# Patient Record
Sex: FEMALE | Race: WHITE | URBAN - METROPOLITAN AREA
[De-identification: names, ages, dates, MRNs, and addresses within clinical notes are randomized per-mention and may not be internally consistent; named-entity substitution may affect disease eponyms.]

---

## 2016-07-15 LAB
ANTIBODY SCREEN, EXTERNAL: NEGATIVE
CHLAMYDIA, EXTERNAL: NEGATIVE
HBSAG, EXTERNAL: NEGATIVE
N. GONORRHEA, EXTERNAL: NEGATIVE
RUBELLA, EXTERNAL: NORMAL
T. PALLIDUM, EXTERNAL: NEGATIVE
TYPE, ABO & RH, EXTERNAL: NORMAL

## 2017-01-25 LAB — GRBS, EXTERNAL: NEGATIVE

## 2017-02-23 ENCOUNTER — ANESTHESIA EVENT (OUTPATIENT)
Dept: LABOR AND DELIVERY | Age: 32
End: 2017-02-23
Payer: COMMERCIAL

## 2017-02-23 ENCOUNTER — ANESTHESIA (OUTPATIENT)
Dept: LABOR AND DELIVERY | Age: 32
End: 2017-02-23
Payer: COMMERCIAL

## 2017-02-23 ENCOUNTER — HOSPITAL ENCOUNTER (INPATIENT)
Age: 32
LOS: 2 days | Discharge: HOME | End: 2017-02-25
Attending: OBSTETRICS & GYNECOLOGY | Admitting: OBSTETRICS & GYNECOLOGY
Payer: COMMERCIAL

## 2017-02-23 PROBLEM — Z34.90 PREGNANT: Status: ACTIVE | Noted: 2017-02-23

## 2017-02-23 LAB
ERYTHROCYTE [DISTWIDTH] IN BLOOD BY AUTOMATED COUNT: 15.1 % (ref 11.5–14.5)
HCT VFR BLD AUTO: 34.7 % (ref 35–47)
HGB BLD-MCNC: 10.9 G/DL (ref 11.5–16)
MCH RBC QN AUTO: 26.2 PG (ref 26–34)
MCHC RBC AUTO-ENTMCNC: 31.4 G/DL (ref 30–36.5)
MCV RBC AUTO: 83.4 FL (ref 80–99)
PLATELET # BLD AUTO: 248 K/UL (ref 150–400)
RBC # BLD AUTO: 4.16 M/UL (ref 3.8–5.2)
WBC # BLD AUTO: 12.6 K/UL (ref 3.6–11)

## 2017-02-23 PROCEDURE — 3E0R3CZ INTRODUCE REGIONAL ANESTH IN SPINAL CANAL, PERC: ICD-10-PCS | Performed by: OBSTETRICS & GYNECOLOGY

## 2017-02-23 PROCEDURE — 00HU33Z INSERTION OF INFUSION DEVICE INTO SPINAL CANAL, PERCUTANEOUS APPROACH: ICD-10-PCS | Performed by: OBSTETRICS & GYNECOLOGY

## 2017-02-23 PROCEDURE — 36415 COLL VENOUS BLD VENIPUNCTURE: CPT | Performed by: OBSTETRICS & GYNECOLOGY

## 2017-02-23 PROCEDURE — 85027 COMPLETE CBC AUTOMATED: CPT | Performed by: OBSTETRICS & GYNECOLOGY

## 2017-02-23 PROCEDURE — 77030034850

## 2017-02-23 PROCEDURE — 74011250636 HC RX REV CODE- 250/636: Performed by: OBSTETRICS & GYNECOLOGY

## 2017-02-23 PROCEDURE — 77030014125 HC TY EPDRL BBMI -B: Performed by: ANESTHESIOLOGY

## 2017-02-23 PROCEDURE — 10907ZC DRAINAGE OF AMNIOTIC FLUID, THERAPEUTIC FROM PRODUCTS OF CONCEPTION, VIA NATURAL OR ARTIFICIAL OPENING: ICD-10-PCS | Performed by: OBSTETRICS & GYNECOLOGY

## 2017-02-23 PROCEDURE — 65270000029 HC RM PRIVATE

## 2017-02-23 PROCEDURE — 74011250636 HC RX REV CODE- 250/636: Performed by: ANESTHESIOLOGY

## 2017-02-23 PROCEDURE — 0HQ9XZZ REPAIR PERINEUM SKIN, EXTERNAL APPROACH: ICD-10-PCS | Performed by: OBSTETRICS & GYNECOLOGY

## 2017-02-23 PROCEDURE — 74011000250 HC RX REV CODE- 250

## 2017-02-23 RX ORDER — SODIUM CHLORIDE, SODIUM LACTATE, POTASSIUM CHLORIDE, CALCIUM CHLORIDE 600; 310; 30; 20 MG/100ML; MG/100ML; MG/100ML; MG/100ML
125 INJECTION, SOLUTION INTRAVENOUS CONTINUOUS
Status: DISCONTINUED | OUTPATIENT
Start: 2017-02-23 | End: 2017-02-23

## 2017-02-23 RX ORDER — SODIUM CHLORIDE 0.9 % (FLUSH) 0.9 %
5-10 SYRINGE (ML) INJECTION EVERY 8 HOURS
Status: DISCONTINUED | OUTPATIENT
Start: 2017-02-23 | End: 2017-02-23

## 2017-02-23 RX ORDER — SODIUM CHLORIDE 0.9 % (FLUSH) 0.9 %
5-10 SYRINGE (ML) INJECTION AS NEEDED
Status: DISCONTINUED | OUTPATIENT
Start: 2017-02-23 | End: 2017-02-23

## 2017-02-23 RX ORDER — NALOXONE HYDROCHLORIDE 0.4 MG/ML
0.4 INJECTION, SOLUTION INTRAMUSCULAR; INTRAVENOUS; SUBCUTANEOUS AS NEEDED
Status: DISCONTINUED | OUTPATIENT
Start: 2017-02-23 | End: 2017-02-23

## 2017-02-23 RX ORDER — DOCUSATE SODIUM 100 MG/1
100 CAPSULE, LIQUID FILLED ORAL
Status: DISCONTINUED | OUTPATIENT
Start: 2017-02-23 | End: 2017-02-25 | Stop reason: HOSPADM

## 2017-02-23 RX ORDER — IBUPROFEN 800 MG/1
800 TABLET ORAL EVERY 8 HOURS
Status: DISCONTINUED | OUTPATIENT
Start: 2017-02-24 | End: 2017-02-25 | Stop reason: HOSPADM

## 2017-02-23 RX ORDER — TERBUTALINE SULFATE 1 MG/ML
INJECTION SUBCUTANEOUS
Status: DISCONTINUED
Start: 2017-02-23 | End: 2017-02-23

## 2017-02-23 RX ORDER — ZOLPIDEM TARTRATE 5 MG/1
5 TABLET ORAL
Status: DISCONTINUED | OUTPATIENT
Start: 2017-02-23 | End: 2017-02-25 | Stop reason: HOSPADM

## 2017-02-23 RX ORDER — HYDROCORTISONE ACETATE PRAMOXINE HCL 2.5; 1 G/100G; G/100G
CREAM TOPICAL AS NEEDED
Status: DISCONTINUED | OUTPATIENT
Start: 2017-02-23 | End: 2017-02-25 | Stop reason: HOSPADM

## 2017-02-23 RX ORDER — NALOXONE HYDROCHLORIDE 0.4 MG/ML
0.4 INJECTION, SOLUTION INTRAMUSCULAR; INTRAVENOUS; SUBCUTANEOUS AS NEEDED
Status: DISCONTINUED | OUTPATIENT
Start: 2017-02-23 | End: 2017-02-25 | Stop reason: HOSPADM

## 2017-02-23 RX ORDER — SIMETHICONE 80 MG
80 TABLET,CHEWABLE ORAL
Status: DISCONTINUED | OUTPATIENT
Start: 2017-02-23 | End: 2017-02-25 | Stop reason: HOSPADM

## 2017-02-23 RX ORDER — BUTORPHANOL TARTRATE 1 MG/ML
1 INJECTION INTRAMUSCULAR; INTRAVENOUS
Status: DISCONTINUED | OUTPATIENT
Start: 2017-02-23 | End: 2017-02-23

## 2017-02-23 RX ORDER — OXYTOCIN IN 5 % DEXTROSE 30/500 ML
1-25 PLASTIC BAG, INJECTION (ML) INTRAVENOUS
Status: DISCONTINUED | OUTPATIENT
Start: 2017-02-23 | End: 2017-02-23

## 2017-02-23 RX ORDER — ACETAMINOPHEN 325 MG/1
650 TABLET ORAL
Status: DISCONTINUED | OUTPATIENT
Start: 2017-02-23 | End: 2017-02-25 | Stop reason: HOSPADM

## 2017-02-23 RX ORDER — ONDANSETRON 4 MG/1
4 TABLET, ORALLY DISINTEGRATING ORAL
Status: DISCONTINUED | OUTPATIENT
Start: 2017-02-23 | End: 2017-02-23

## 2017-02-23 RX ORDER — FENTANYL/BUPIVACAINE/NS/PF 2-1250MCG
1-16 PREFILLED PUMP RESERVOIR EPIDURAL CONTINUOUS
Status: DISCONTINUED | OUTPATIENT
Start: 2017-02-23 | End: 2017-02-23

## 2017-02-23 RX ORDER — MAG HYDROX/ALUMINUM HYD/SIMETH 200-200-20
30 SUSPENSION, ORAL (FINAL DOSE FORM) ORAL
Status: DISCONTINUED | OUTPATIENT
Start: 2017-02-23 | End: 2017-02-23

## 2017-02-23 RX ORDER — LIDOCAINE HYDROCHLORIDE AND EPINEPHRINE 20; 5 MG/ML; UG/ML
INJECTION, SOLUTION EPIDURAL; INFILTRATION; INTRACAUDAL; PERINEURAL AS NEEDED
Status: DISCONTINUED | OUTPATIENT
Start: 2017-02-23 | End: 2017-02-23 | Stop reason: HOSPADM

## 2017-02-23 RX ORDER — ONDANSETRON 2 MG/ML
4 INJECTION INTRAMUSCULAR; INTRAVENOUS
Status: DISCONTINUED | OUTPATIENT
Start: 2017-02-23 | End: 2017-02-25 | Stop reason: HOSPADM

## 2017-02-23 RX ORDER — ADHESIVE BANDAGE
30 BANDAGE TOPICAL DAILY PRN
Status: DISCONTINUED | OUTPATIENT
Start: 2017-02-23 | End: 2017-02-23

## 2017-02-23 RX ORDER — HYDROCODONE BITARTRATE AND ACETAMINOPHEN 5; 325 MG/1; MG/1
1 TABLET ORAL
Status: DISCONTINUED | OUTPATIENT
Start: 2017-02-23 | End: 2017-02-25 | Stop reason: HOSPADM

## 2017-02-23 RX ORDER — ACETAMINOPHEN 325 MG/1
650 TABLET ORAL
Status: DISCONTINUED | OUTPATIENT
Start: 2017-02-23 | End: 2017-02-23

## 2017-02-23 RX ORDER — OXYTOCIN/RINGER'S LACTATE 20/1000 ML
125-500 PLASTIC BAG, INJECTION (ML) INTRAVENOUS ONCE
Status: ACTIVE | OUTPATIENT
Start: 2017-02-24 | End: 2017-02-24

## 2017-02-23 RX ORDER — DIPHENHYDRAMINE HCL 25 MG
25 CAPSULE ORAL
Status: DISCONTINUED | OUTPATIENT
Start: 2017-02-23 | End: 2017-02-25 | Stop reason: HOSPADM

## 2017-02-23 RX ORDER — SODIUM CHLORIDE, SODIUM LACTATE, POTASSIUM CHLORIDE, CALCIUM CHLORIDE 600; 310; 30; 20 MG/100ML; MG/100ML; MG/100ML; MG/100ML
999 INJECTION, SOLUTION INTRAVENOUS ONCE
Status: DISCONTINUED | OUTPATIENT
Start: 2017-02-23 | End: 2017-02-23

## 2017-02-23 RX ADMIN — LIDOCAINE HYDROCHLORIDE AND EPINEPHRINE 5 ML: 20; 5 INJECTION, SOLUTION EPIDURAL; INFILTRATION; INTRACAUDAL; PERINEURAL at 13:42

## 2017-02-23 RX ADMIN — LIDOCAINE HYDROCHLORIDE AND EPINEPHRINE 5 ML: 20; 5 INJECTION, SOLUTION EPIDURAL; INFILTRATION; INTRACAUDAL; PERINEURAL at 13:46

## 2017-02-23 RX ADMIN — SODIUM CHLORIDE, SODIUM LACTATE, POTASSIUM CHLORIDE, AND CALCIUM CHLORIDE 125 ML/HR: 600; 310; 30; 20 INJECTION, SOLUTION INTRAVENOUS at 13:20

## 2017-02-23 RX ADMIN — FENTANYL 0.2 MG/100ML-BUPIV 0.125%-NACL 0.9% EPIDURAL INJ 12 ML/HR: 2/0.125 SOLUTION at 13:54

## 2017-02-23 RX ADMIN — FENTANYL 0.2 MG/100ML-BUPIV 0.125%-NACL 0.9% EPIDURAL INJ 12 ML/HR: 2/0.125 SOLUTION at 19:11

## 2017-02-23 RX ADMIN — SODIUM CHLORIDE, SODIUM LACTATE, POTASSIUM CHLORIDE, AND CALCIUM CHLORIDE 125 ML/HR: 600; 310; 30; 20 INJECTION, SOLUTION INTRAVENOUS at 10:55

## 2017-02-23 NOTE — PROGRESS NOTES
Labor Progress Note  Patient seen, fetal heart rate and contraction pattern evaluated at 110pm. Patient is starting to feel more pain with her contractions. Physical Exam:  Vitals:   Vitals:    02/23/17 1042 02/23/17 1101 02/23/17 1102   BP: 120/76  120/76   Pulse: 93  93   Resp:   18   Temp:   97.9 °F (36.6 °C)   Weight:  88 kg (194 lb)    Height:  5' 6\" (1.676 m)          Cervical Exam was examined   4 cm dilated    100% effaced    -1 station    Presenting Part: cephalic  Cervical Position: mid position  Consistency: Soft  AROM, clear fluid    Uterine Activity[de-identified] Frequency: Every 3-5 minutes  Fetal Heart Rate: Reactive  Pitocin: 0    Assessment/Plan:  Ms. Zoë Barth is admitted with pregnancy at 39w6d with labor. RFS. AROM done, Epidural PRN.      1.  Continue expectant management    Romaine Bower MD  2/23/2017  1:12 PM

## 2017-02-23 NOTE — PROGRESS NOTES
Labor Progress Note  Patient seen, fetal heart rate and contraction pattern evaluated at 415pm. Called to see patient due to prolonged deceleration for 7 min. Nursing noted patient had a tetonic contraction during this time period. Physical Exam:  Vitals:   Vitals:    02/23/17 1410 02/23/17 1512 02/23/17 1558 02/23/17 1559   BP:    (!) 87/53   Pulse:  91  79   Resp: 18 18     Temp: 98.2 °F (36.8 °C) 97.9 °F (36.6 °C) 98.3 °F (36.8 °C)    Weight:       Height:             Cervical Exam was examined   8 cm dilated    100% effaced    0 station    Presenting Part: cephalic  Cervical Position: anterior  Consistency: Soft  IUPC/FSE    Uterine Activity[de-identified] Frequency: Every 2 minutes  Fetal Heart Rate: Baseline: 120 per minute  Variability: moderate  Accelerations: yes  Decelerations: prolonged variable  Pitocin: 0    Assessment/Plan:  Ms. Karyle Cristal is admitted with pregnancy at 39w6d with labor. Patient is making consistent cervical change. Suspect deceleration was related to her 7 min contraction that resolved spontaneously. Internal monitors placed. Will monitor closely. Anticipate vaginal delivery.      1.  Continue expectant management    Bhupendra Temple MD  2/23/2017  4:16 PM

## 2017-02-23 NOTE — PROGRESS NOTES
arrived ambulatory for labor. Placed on EFMx2. Consents explained, pt verbalized understanding, then signed. 11:34 AM  Dr. Virginie Merchant at bedside. 1:33 PM  KATIE Scruggs at bedside for epidural placement. 1:37 PM  Timeout. 1:43 PM test dose. 2:32 PM  INDY Garcia assuming care.

## 2017-02-23 NOTE — IP AVS SNAPSHOT
[address] 
[phone] Patient: Suzie Simon MRN: [id number] :1985 You are allergic to the following No active allergies Recent Documentation Height  
  
  
  
  
  
 1.676 m Unresulted Labs Order Current Status SAMPLE TO BLOOD BANK In process Emergency Contacts Name Discharge Info Relation Home Work Mobile [place] DISCHARGE CAREGIVER [3] Spouse [3]   [phone] About your hospitalization You were admitted on:  2017 You last received care in the:  [facility] 2 LABOR & DELIVERY You were discharged on:  2017 Unit phone number:  [phone] Why you were hospitalized Your primary diagnosis was:  Not on File Your diagnoses also included:  Pregnant Providers Seen During Your Hospitalizations Provider Role Specialty Primary office phone Winston Baumann MD Attending Provider Obstetrics & Gynecology [phone] Your Primary Care Physician (PCP) Primary Care Physician Office Phone Office Fax Tiffanie De Los Santos [phone] Follow-up Information Follow up With Details Comments Contact Info Winston Baumann MD   [address] [facility] [address] [place] [address] 
[phone] Current Discharge Medication List  
  
ASK your doctor about these medications Dose & Instructions Dispensing Information Comments Morning Noon Evening Bedtime  
 prenatal multivit-ca-min-fe-fa Tab Your next dose is: Today, Tomorrow Other:  _________ Dose:  1 Cap Take 1 Cap by mouth daily. Indications: Pregnancy Refills:  0 Discharge Instructions Discharge Instructions for Vaginal Delivery Patient ID: Suzie Simon [id number] 
28 y.o. 
1985 Take Home Medications Continue taking your prenatal vitamins if you are breastfeeding. Follow-up care is a key part of your treatment and safety. Please schedule and keep appointments. Follow-up with your primary OB in 6 weeks. Activity Avoid anything in your vagina for 6 weeks (no intercourse, tampons, or douching). You may drive unless you are taking prescription pain medications. Climbing stairs and light lifting are okay. Please avoid excessive exercise, though walking is okay- you'll be tired! Diet Regular diet as tolerated. Be sure to drink plenty of fluids if you are breastfeeding. Wound care If you have stitches, continue to rinse with a squirt bottle of warm water each time you void for about 7-10 days. .  Your stitches will gradually dissolve over four to eight weeks. Sitz baths are also helpful to keep the wound clean, encourage healing, and to help with pain associated with the stitches or hemorrhoids. You can use either a sitz bath basin or a bathtub filled with 2-3\" inches of plain warm water. Soak for 10 minutes 3 times a day as tolerated. Pain Management 1. Over the counter medications such as Tylenol and ibuprofen (Motrin or Advil) are ideal.  These may be taken together, alternating doses. You may  take the maximum dose:  Motrin or Advil (generic ibuprofen), either 3 tablets every 6 hours or 4 tablets every 8 hours or Tylenol (acetominophen) 1000mg every 6 hours (equivalent to 2 extra strength Tylenol). 2. You may also have a precrescription for stronger pain medication. Take only as needed and transition to over the counter medication in the next few days. Minimize amounts of the prescription medication, as it can be habit-forming and will worsen or cause constipation. Most patients will find that within a couple of days, their pain is adequately controlled using only over-the-counter medications.  
3. The prescription pain medication is mixed with Tylenol, therefore, you should not take any extra Tylenol or acetaminophen until you have reduced your prescription pain medication. 4. Add heating pad or sitz baths as needed. Add hemorrhoid wipes or ointments if needed Constipation 1. Constipation is normal after pregnancy and delivery, especially while taking prescription narcotic pain medication. 2. Over the counter remedies including ducosate (Colace), take 1-2 capsules 1-2 times daily for soft stool as needed. You may also add/ try milk of magnesia or rectal remedies such as Dulcolax or Fleets enema. Recovery: What to Expect at [facility] 1. Fatigue is expected. Try to rest when you can and don't worry about doing housework or other tasks which can wait. 2. The soreness along your bottom will improve significantly over the first 2 weeks, but it may take 6 weeks before you are completely recovered. 3. Back pain or general body aches or muscle soreness are expected and should improve with acetominophen or ibuprofen. 4. Leg swelling due to pregnancy and/or IV fluids given in the hospital will take about two weeks to resolve. 5. Most women experience some form of the \"Baby Blues\" after having a baby. Feeling emotional, tearful, frustrated, anxious, sad, and irritable some of the time is normal and go away after about 2 weeks. Adequate rest and help from your family will help. Take breaks from caring for the baby. Call your doctor if your symptoms seem severe, last more than 2 weeks, or seem to be getting worse instead of better. Get help immediately if you have thoughts of wanting to hurt yourself or others! Call your doctor or seek immediate medical care if you have: 
Heavy vaginal bleeding, soaking through one or more pads an hour for several hours. Foul-smelling discharge from your vagina or incision. Consistent nausea and vomiting and cannot keep fluids down. Consistent pain that does not get better after you take pain medicine.  
Sudden chest pain and shortness of breath Signs of a blood clot: pain/ swelling/ increasing redness in your lower extremeties Signs of infection: increased pain in your abdomen or vaginal area; red streaks, warmth, or tenderness of your breasts; fever of 100.5 F or greater Discharge Orders None [email or web address] Announcement We are excited to announce that we are making your provider's discharge notes available to you in [employer]. You will see these notes when they are completed and signed by the physician that discharged you from your recent hospital stay. If you have any questions or concerns about any information you see in [employer], please call the Health Information Department where you were seen or reach out to your Primary Care Provider for more information about your plan of care. Introducing [facility] & [facility] SERVICES! Marquise Quach introduces [employer] patient portal. Now you can access parts of your medical record, email your doctor's office, and request medication refills online. 1. In your internet browser, go to [email or web address] 2. Click on the First Time User? Click Here link in the Sign In box. You will see the New Member Sign Up page. 3. Enter your [employer] Access Code exactly as it appears below. You will not need to use this code after youve completed the sign-up process. If you do not sign up before the expiration date, you must request a new code. · [employer] Access Code: [email or web address] Expires: 4/9/2017  2:54 PM 
 
4. Enter the last four digits of your Social Security Number (xxxx) and Date of Birth (mm/dd/yyyy) as indicated and click Submit. You will be taken to the next sign-up page. 5. Create a [employer] ID. This will be your [employer] login ID and cannot be changed, so think of one that is secure and easy to remember. 6. Create a [employer] password. You can change your password at any time. 7. Enter your Password Reset Question and Answer.  This can be used at a later time if you forget your password. 8. Enter your e-mail address. You will receive e-mail notification when new information is available in [address] 9. Click Sign Up. You can now view and download portions of your medical record. 10. Click the Download Summary menu link to download a portable copy of your medical information. If you have questions, please visit the Frequently Asked Questions section of the [employer] Every[employer] website. Remember, [employer] is NOT to be used for urgent needs. For medical emergencies, dial 911. Now available from your iPhone and Android! General Information Please provide this summary of care documentation to your next provider. Patient Signature:  ____________________________________________________________ Date:  ____________________________________________________________  
  
Andree Guevara Provider Signature:  ____________________________________________________________ Date:  ____________________________________________________________
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
## 2017-02-23 NOTE — ANESTHESIA PREPROCEDURE EVALUATION
Anesthetic History   No history of anesthetic complications            Review of Systems / Medical History  Patient summary reviewed, nursing notes reviewed and pertinent labs reviewed    Pulmonary  Within defined limits                 Neuro/Psych   Within defined limits           Cardiovascular  Within defined limits                     GI/Hepatic/Renal  Within defined limits              Endo/Other  Within defined limits           Other Findings              Physical Exam    Airway  Mallampati: II  TM Distance: 4 - 6 cm  Neck ROM: normal range of motion   Mouth opening: Normal     Cardiovascular  Regular rate and rhythm,  S1 and S2 normal,  no murmur, click, rub, or gallop             Dental  No notable dental hx       Pulmonary  Breath sounds clear to auscultation               Abdominal  Abdominal exam normal       Other Findings            Anesthetic Plan    ASA: 2  Anesthesia type: epidural            Anesthetic plan and risks discussed with: Patient

## 2017-02-23 NOTE — PROGRESS NOTES
Prolong decel noted to 90s. Pt placed to left lateral position,  IV fluid bolus started and O2 via tight face mask in place. 4:09 PM  Dr. Britton Meza at bedside. Fetal tracing reviewed. Informed MD of prolonged decels, SVE 5-6cm and no terb given at this time. 4:15 PM  Dr. Noman William at bedside IUPC and FSE placed by MD.  6:57 PM  TRANSFER - OUT REPORT:    Verbal report given to TRACEY Salazar RN on [employer]  for routine progression of care       Report consisted of patients Situation, Background, Assessment and   Recommendations(SBAR). Information from the following report(s) SBAR and MAR was reviewed with the receiving nurse. Lines:   Peripheral IV 02/23/17 Right Hand (Active)   Site Assessment Clean, dry, & intact 2/23/2017 10:00 AM   Phlebitis Assessment 0 2/23/2017 10:00 AM   Infiltration Assessment 0 2/23/2017 10:00 AM   Dressing Status Clean, dry, & intact 2/23/2017 10:00 AM   Dressing Type Tape 2/23/2017 10:00 AM   Hub Color/Line Status Pink 2/23/2017 10:00 AM        Opportunity for questions and clarification was provided.

## 2017-02-23 NOTE — IP AVS SNAPSHOT
Current Discharge Medication List  
  
ASK your doctor about these medications Dose & Instructions Dispensing Information Comments Morning Noon Evening Bedtime  
 prenatal multivit-ca-min-fe-fa Tab Your next dose is: Today, Tomorrow Other:  ____________ Dose:  1 Cap Take 1 Cap by mouth daily. Indications: Pregnancy Refills:  0

## 2017-02-23 NOTE — ANESTHESIA PROCEDURE NOTES
Epidural Block    Start time: 2/23/2017 1:35 PM  End time: 2/23/2017 1:50 PM  Performed by: Maggie Stevens by: Maricarmen Shelby     Pre-Procedure  Indication: labor epidural    Preanesthetic Checklist: patient identified, risks and benefits discussed, anesthesia consent, timeout performed and anesthesia consent    Timeout Time: 13:37        Epidural:   Patient position:  Seated  Prep region:  Lumbar  Prep: Betadine    Location:  L3-4    Needle and Epidural Catheter:   Needle Type:  Tuohy  Needle Gauge:  17 G  Injection Technique:  Loss of resistance using air  Attempts:  1  Catheter Size:  18 G  Catheter at Skin Depth (cm):  11  Depth in Epidural Space (cm):  8  Events: no paresthesia and negative aspiration test    Test Dose:  Lidocaine 1.5% w/ epi and negative    Assessment:   Catheter Secured:  Tegaderm and tape  Insertion:  Uncomplicated  Patient tolerance:  Patient tolerated the procedure well with no immediate complications

## 2017-02-23 NOTE — PROGRESS NOTES
[address] [facility] Dr Ten Caro paged    2214 Received return call from Dr Deepak Lerma. Informed and ask Dr Ten Caro to review EFM strip. decels noted on strip with mod variability. Per Dr Deepak Lerma , he reviewed efm strip and he noted a few late decels and early. To place pt in trendelenburg and give 1 liter bolus.  Carla Gee RN resume care at 1500 and made aware of Dr Gerry Carrero orders

## 2017-02-23 NOTE — PROGRESS NOTES
Labor Progress Note  Patient seen, fetal heart rate and contraction pattern evaluated at 5pm. Patient is starting to feel rectal pressure. Physical Exam:  Vitals:   Vitals:    02/23/17 1617 02/23/17 1627 02/23/17 1628 02/23/17 1632   BP:   116/72    Pulse:   80    Resp:       Temp:       SpO2: 99% 99%  99%   Weight:       Height:             Cervical Exam was examined   9 cm dilated    100% effaced    0 station      Uterine Activity[de-identified] Frequency: Every 1-4 minutes  Fetal Heart Rate: Reactive  Pitocin: 0    Assessment/Plan:  Ms. Darryl Opitz is admitted with pregnancy at 40w5d in labor. Patient continues to make cervical change. Recheck PRN. Anticipate vaginal delivery.          Cesia Kwan MD  2/23/2017  5:06 PM

## 2017-02-24 PROCEDURE — 75410000003 HC RECOV DEL/VAG/CSECN EA 0.5 HR: Performed by: OBSTETRICS & GYNECOLOGY

## 2017-02-24 PROCEDURE — 75410000002 HC LABOR FEE PER 1 HR: Performed by: OBSTETRICS & GYNECOLOGY

## 2017-02-24 PROCEDURE — 77030018749 HC HK AMNIO DISP DERY -A

## 2017-02-24 PROCEDURE — 77030010848 HC CATH INTUTR PRSS KOLB -B

## 2017-02-24 PROCEDURE — 65270000029 HC RM PRIVATE

## 2017-02-24 PROCEDURE — 77030034850

## 2017-02-24 PROCEDURE — 75410000000 HC DELIVERY VAGINAL/SINGLE: Performed by: OBSTETRICS & GYNECOLOGY

## 2017-02-24 PROCEDURE — 76060000078 HC EPIDURAL ANESTHESIA: Performed by: NURSE ANESTHETIST, CERTIFIED REGISTERED

## 2017-02-24 PROCEDURE — 77010026064 HC OXYGEN INFANT MED AIR MIN: Performed by: OBSTETRICS & GYNECOLOGY

## 2017-02-24 NOTE — DISCHARGE INSTRUCTIONS
Discharge Instructions for Vaginal Delivery    Patient ID:  Keo Davenport  [id number]  28 y.o.  1985    Take Home Medications       Continue taking your prenatal vitamins if you are breastfeeding. Follow-up care is a key part of your treatment and safety. Please schedule and keep appointments. Follow-up with your primary OB in 6 weeks. Activity  Avoid anything in your vagina for 6 weeks (no intercourse, tampons, or douching). You may drive unless you are taking prescription pain medications. Climbing stairs and light lifting are okay. Please avoid excessive exercise, though walking is okay- you'll be tired! Diet  Regular diet as tolerated. Be sure to drink plenty of fluids if you are breastfeeding. Wound care  If you have stitches, continue to rinse with a squirt bottle of warm water each time you void for about 7-10 days. .  Your stitches will gradually dissolve over four to eight weeks. Sitz baths are also helpful to keep the wound clean, encourage healing, and to help with pain associated with the stitches or hemorrhoids. You can use either a sitz bath basin or a bathtub filled with 2-3\" inches of plain warm water. Soak for 10 minutes 3 times a day as tolerated. Pain Management  1. Over the counter medications such as Tylenol and ibuprofen (Motrin or Advil) are ideal.  These may be taken together, alternating doses. You may  take the maximum dose:  Motrin or Advil (generic ibuprofen), either 3 tablets every 6 hours or 4 tablets every 8 hours or Tylenol (acetominophen) 1000mg every 6 hours (equivalent to 2 extra strength Tylenol). 2. You may also have a precrescription for stronger pain medication. Take only as needed and transition to over the counter medication in the next few days. Minimize amounts of the prescription medication, as it can be habit-forming and will worsen or cause constipation.  Most patients will find that within a couple of days, their pain is adequately controlled using only over-the-counter medications. 3. The prescription pain medication is mixed with Tylenol, therefore, you should not take any extra Tylenol or acetaminophen until you have reduced your prescription pain medication. 4. Add heating pad or sitz baths as needed. Add hemorrhoid wipes or ointments if needed    Constipation  1. Constipation is normal after pregnancy and delivery, especially while taking prescription narcotic pain medication. 2. Over the counter remedies including ducosate (Colace), take 1-2 capsules 1-2 times daily for soft stool as needed. You may also add/ try milk of magnesia or rectal remedies such as Dulcolax or Fleets enema. Recovery: What to Expect at Home  1. Fatigue is expected. Try to rest when you can and don't worry about doing housework or other tasks which can wait. 2. The soreness along your bottom will improve significantly over the first 2 weeks, but it may take 6 weeks before you are completely recovered. 3. Back pain or general body aches or muscle soreness are expected and should improve with acetominophen or ibuprofen. 4. Leg swelling due to pregnancy and/or IV fluids given in the hospital will take about two weeks to resolve. 5. Most women experience some form of the \"Baby Blues\" after having a baby. Feeling emotional, tearful, frustrated, anxious, sad, and irritable some of the time is normal and go away after about 2 weeks. Adequate rest and help from your family will help. Take breaks from caring for the baby. Call your doctor if your symptoms seem severe, last more than 2 weeks, or seem to be getting worse instead of better. Get help immediately if you have thoughts of wanting to hurt yourself or others! Call your doctor or seek immediate medical care if you have:  Heavy vaginal bleeding, soaking through one or more pads an hour for several hours. Foul-smelling discharge from your vagina or incision.   Consistent nausea and vomiting and cannot keep fluids down. Consistent pain that does not get better after you take pain medicine.   Sudden chest pain and shortness of breath  Signs of a blood clot: pain/ swelling/ increasing redness in your lower extremeties  Signs of infection: increased pain in your abdomen or vaginal area; red streaks, warmth, or tenderness of your breasts; fever of 100.5 F or greater

## 2017-02-24 NOTE — L&D DELIVERY NOTE
Delivery Summary    Patient: Jax Guerra MRN: [id number]  SSN: [id number]    YOB: 1985  Age: 28 y.o. Sex: female        Labor Events:    Labor: No    Rupture Date: 2017    Rupture Time: 1:12 PM    Rupture Type AROM    Amniotic Fluid Volume: Large     Amniotic Fluid Description: Clear       Induction: None         Augmentation: None    Labor Complications: None     Additional Complications:        Cervical Ripening:       None      Delivery Events:  Episiotomy: None    Laceration(s): Left labial;First degree perineal       Repaired: Yes     Number of Repair Packets: 2    Suture Type and Size: Chromic 4-0        Estimated Blood Loss (ml): 300        Information for the patient's newbornTerrishania Rosenthal, Female [[id number]]     Delivery Summary - Baby    Delivery Date: 2017   Delivery Time: 10:40 PM   Delivery Type: Vaginal, Spontaneous Delivery  Sex:  female  Gestational Age: 39w6d  Delivery Clinician:  Armida Carranza  Living?: Yes   Delivery Location: L&D 207           APGARS  One minute Five minutes Ten minutes   Skin Color: 0    1       Heart Rate: 2   2         Reflex Irritability: 1   2         Muscle Tone: 0   2       Respiration: 0   2         Total: 3   9           Presentation: Compound  Position: Right Occiput Anterior  Resuscitation Method:  Tactile Stimulation;Suctioning-bulb;PPV;Suctioning-deep     Meconium Stained: None    Cord Information: 3 Vessels   Complications: None  Cord Blood Sent?:  No    Blood Gases Sent?:  No    Placenta:  Date/Time:  10:42 PM  Removal: Spontaneous      Appearance: Normal     [place] Measurements:  Birth Weight: 3.175 kg    Birth Length:     Head Circumference:       Chest Circumference:      Abdominal Girth:       Other Providers:   DEANNA SILVERIO;THAIS HARRISON;AMALIA TABARES;BOO MCGOVERN Obstetrician;Primary Nurse;Primary  Nurse;Staff Nurse;Crna;Nurse Practitioner;Midwife;Nursery Nurse           Cord Blood Results:  Information for the patient's :  Aaron Caldera, Female [[id number]]   No results found for: Jens Skains, PCTDIG, BILI, ABORHEXT, ABORH    Information for the patient's :  Aaron Caldera, Female [[id number]]   No results found for: APH, APCO2, APO2, AHCO3, ABEC, ABDC, O2ST, [place], [place], PHI, [place], PO2I, HCO3I, SO2I, IBD     Information for the patient's newbornRuben Jalloh, Female [[id number]]   No results found for: EPHV, PCO2V, PO2V, HCO3V, O2STV, EBDV

## 2017-02-24 NOTE — DISCHARGE SUMMARY
Patient ID:  Frankie Cornea  [id number]  16 y.o.  1985    Admit Date: 2017    Discharge Date:     Admitting Physician: Dorothy Duggan MD    Attending Physician: Dorothy Duggan MD    Admission Diagnoses: LABOR  Pregnant    Procedures for this admission:  epidural    Discharge Diagnoses: Same as above with  producing a viable infant. Information for the patient's :  London Orellana, Female [[id number]]   One Minute Apgar: 3 (Filed from Delivery Summary)  Five  Minute Apgar: 9 (Filed from Delivery Summary)   7lb 0oz  Left labial and vaginal 1st degree     Discharge Disposition:  good    Discharge Condition:  good    Additional Diagnoses: IUP 40wks 5 days labor. Maternal Labs:   Lab Results   Component Value Date/Time    HBsAg, External negative 07/15/2016    Rubella, External 1.53-immune 07/15/2016    GrBStrep, External negative 2017       Cord Blood Results:   Information for the patient's :  London Orellana, Female [[id number]]   No results found for: PCTABR, PCTDIG, BILI, ABORH          History of Present Illness:   OB History      Para Term  AB TAB SAB Ectopic Multiple Living    1                 Admitted for active labor. Hospital Course:   Patient was admitted as above and delivered via  . Please the chart for details. The postpartum course was unremarkable. She was deemed stable for discharge home on day 2.     Follow-up Care: 6wks        Signed:  Frieda Nguyen MD  2017  10:58 PM

## 2017-02-24 NOTE — PROGRESS NOTES
02/24/17 10:58 AM  CM met with patient to complete initial assessment and to begin discharge planning. Patient demographics confirmed. Patient works as a  in [place] and will return to work in September. Patient's /FOB Ramon Schmitt [phone]) works at [employer] and will remain out of work for 2-3 weeks. Patient is breastfeeding and has a pump to use. [facility] will provide medical follow up for the baby. Patient has car seat, crib, clothing, and other necessary supplies. Patient ineligible for [facility] and Medicaid services. Patient and  have a support system of local friends; their families live in [place], [place], and will visit the family and baby in the coming weeks. Care Management Interventions  PCP Verified by CM:  Yes  Transition of Care Consult (CM Consult): Discharge Planning  Current Support Network: Lives with Spouse  Confirm Follow Up Transport: Family  Plan discussed with Pt/Family/Caregiver: Yes  Discharge Location  Discharge Placement: [address] [place] [address]

## 2017-02-24 NOTE — PROGRESS NOTES
Labor Progress Note late entry from approx 2130  Patient seen, fetal heart rate and contraction pattern evaluated, patient examined  CTSP to eval pushing. No data found.       Physical Exam:  Cervical Exam:  10 cm dilated    100% effaced    +3 station  /5  Uterine Activity: Frequency: Every 2-3 minutes  Fetal Heart Rate: Reactive  Baseline: 130 140 per minute  Variability: moderate  Accelerations: yes  Decelerations: combination of variables and lates intermittently w/ pushing  Good maternal effort    Assessment/Plan:  IUP 40+wks stage 2  Labor  FWB reassuring cat 2 tracing  Cont pushing/ expectant management  ques answered    Papo Summers MD

## 2017-02-24 NOTE — PROGRESS NOTES
PostPartum Note    Jacobo Cleveland  [id number]  1985  28 y.o.    S:  Ms. Jacobo Cleveland is a 28 y.o.  PPD #1 s/p  @ 40w5d. Doing well. She had a baby girl. Her lochia is like a period. She describes her pain as mild and is well controlled with PO medications. She is ambulating and voiding. Tolerating PO intake. O:   Visit Vitals    /63    Pulse 88    Temp 98 °F (36.7 °C)    Resp 16    Ht 5' 6\" (1.676 m)    Wt 88 kg (194 lb)    SpO2 99%    Breastfeeding Unknown    BMI 31.31 kg/m2       Lab Results   Component Value Date/Time    WBC 12.6 2017 10:52 AM    HGB 10.9 2017 10:52 AM    HCT 34.7 2017 10:52 AM    PLATELET 603  10:52 AM    MCV 83.4 2017 10:52 AM       Gen - No acute distress  Abdomen - Fundus firm, below the umbilicus   Ext - Warm, well perfused. Nontender    A/P:  PPD #1 s/p  @ 40w5d doing well. 1.  Routine PP instructions/ care discussed  2. Blood type - Rh +  3. Rubella imm  4. Circumcision n/a    5. Discharge PPD#2    6. F/U 4-6 weeks for PP check.       Melina Robertson MD  [facility]

## 2017-02-24 NOTE — PROGRESS NOTES
2/24/2017  0720 am  SBAR report recd from Tristan 72, assumed care of pt at this time.   1900 pm  SBAR report given to [address]

## 2017-02-24 NOTE — LACTATION NOTE
Discussed with mother her plan for feeding. Reviewed the benefits of exclusive breast milk feeding during the hospital stay. Informed her of the risks of using formula to supplement in the first few days of life as well as the benefits of successful breast milk feeding; referred her to the Breastfeeding booklet about this information. She acknowledges understanding of information reviewed and states that it is her plan to BF her infant. Will support her choice and offer additional information as needed. Pt will successfully establish breastfeeding by feeding in response to early feeding cues   or wake every 3h, will obtain deep latch, and will keep log of feedings/output. Taught to BF at hunger cues and or q 2-3 hrs and to offer 10-20 drops of hand expressed colostrum at any non-feeds. Breast Assessment  Left Breast: Medium  Left Nipple:  Intact, Everted  Right Breast: Medium  Right Nipple: Everted, Intact  Breast- Feeding Assessment  Attends Breast-Feeding Classes:  (2nd [facility] visit today; BF basics reviewed, assisted dyad w/comfortable partial side lying Biological Nurturing positioning, infant feeding w/vigor + ease)  Breast-Feeding Experience: No  Breast Trauma/Surgery: No  Type/Quality: Good (Successful BF session observed)  Lactation Consultant Visits  Breast-Feedings: Good   Mother/Infant Observation  Mother Observation: Alignment, Breast comfortable, Recognizes feeding cues, Lets baby end feeding, Nipple round on release  Infant Observation: Rhythmic suck, Opens mouth, Lips flanged, lower, Lips flanged, upper, Latches nipple and aereolae, Feeding cues, Relaxed after feeding (How to ID milk exchange, markers of successful BF session)  LATCH Documentation  Latch: Grasps breast, tongue down, lips flanged, rhythmic sucking  Audible Swallowing: A few with stimulation  Type of Nipple: Everted (after stimulation)  Comfort (Breast/Nipple): Soft/non-tender  Hold (Positioning): Full assist, teach one side, mother does other, staff holds (BF education, support + encouragement provided)  LATCH Score: 8  Biological Nurturing breastfeeding principles taught. How Biological Nurturing (BN)  promotes optimal breastfeeding (BF) sessions discussed. Mother encouraged to seek comfortable semi-reclining breastfeeding positions. Infant placed frontally along maternal contour. Primitive innate feeding reflexes/behaviors of the  discussed. BN tips and techniques shared; assisted with comfortable breastfeeding positioning. Hand Expression Education:  Mom taught how to manually hand express her colostrum. Emphasized the importance of providing infant with valuable colostrum as infant rests skin to skin at breast.  Aware to avoid extended periods of non-feeding. Aware to offer 10-20+ drops of colostrum every 2-3 hours until infant is latching and nursing effectively. Taught the rationale behind this low tech but highly effective evidence based practice.

## 2017-02-24 NOTE — PROGRESS NOTES
Labor Progress Note  Patient seen, fetal heart rate and contraction pattern evaluated, patient examined.    Late entry from approx 1930 and eval now  Patient Vitals for the past 2 hrs:   BP Temp Pulse Resp SpO2   02/23/17 2007 - - - - 98 %   02/23/17 2002 - - - - 98 %   02/23/17 1959 125/74 - 77 - -   02/23/17 1957 - - - - 97 %   02/23/17 1952 - - - - 98 %   02/23/17 1947 - - - - 98 %   02/23/17 1943 124/73 - 79 - -   02/23/17 1942 - - - - 98 %   02/23/17 1937 - - - - 97 %   02/23/17 1932 - - - - 98 %   02/23/17 1930 122/66 - 77 - -   02/23/17 1927 - - - - 99 %   02/23/17 1923 137/78 98.4 °F (36.9 °C) 80 16 99 %   02/23/17 1922 - - - - 98 %   02/23/17 1917 - - - - 99 %   02/23/17 1913 140/76 - 83 - -   02/23/17 1912 - - - - 99 %   02/23/17 1858 126/79 - 80 - -   02/23/17 1852 - - - - 100 %       Physical Exam:  Cervical Exam:  Lip SOCORRO asynclitic 0 station at 1930, c/c/+2/5 SOCORRO now  Uterine Activity: Frequency: Every 1-4 minutes  Fetal Heart Rate: Reactive  Baseline: 130 per minute  Variability: moderate  Accelerations: yes  Decelerations: occ late, occ variable  Just started pushing    Assessment/Plan:  IUP 40wks 5 days stage 2   FWB reassuring cat 2 tracing  Push  Expectant, reviewed w/ family    Polina Martínez MD

## 2017-02-24 NOTE — PROGRESS NOTES
6942-  Bedside and Verbal shift change report given to Billy Daniel RN (oncoming nurse) by Paddy Rubinstein, RN (offgoing nurse). Report included the following information SBAR, Procedure Summary, Intake/Output, MAR, Recent Results and Med Rec Status.

## 2017-02-25 VITALS
TEMPERATURE: 98.6 F | HEIGHT: 66 IN | RESPIRATION RATE: 16 BRPM | SYSTOLIC BLOOD PRESSURE: 130 MMHG | HEART RATE: 96 BPM | OXYGEN SATURATION: 99 % | WEIGHT: 194 LBS | DIASTOLIC BLOOD PRESSURE: 68 MMHG | BODY MASS INDEX: 31.18 KG/M2

## 2017-02-25 NOTE — LACTATION NOTE
Pt will successfully establish breastfeeding by feeding in response to early feeding cues   or wake every 3h, will obtain deep latch, and will keep log of feedings/output. Taught to BF at hunger cues and or q 2-3 hrs and to offer 10-20 drops of hand expressed colostrum at any non-feeds. Breast Assessment  Left Breast: Medium, Large, Engorged (BF on demand, limit for reduced pumping discussed)  Left Nipple: Everted, Intact  Right Breast: Medium, Large, Engorged  Right Nipple: Everted, Intact  Breast- Feeding Assessment  Attends Breast-Feeding Classes:  (Breasts filling, engorgement care reviewed = less pumping)  Breast-Feeding Experience: No  Breast Trauma/Surgery: No  Type/Quality: Good (Phototherapy in progress)  Lactation Consultant Visits  Breast-Feedings: Good   Mother/Infant Observation  Mother Observation: Alignment, Breast comfortable, Lets baby end feeding, Nipple round on release, Recognizes feeding cues  Infant Observation: Audible swallows, Feeding cues, Lips flanged, lower, Latches nipple and aereolae, Frenulum checked, Lips flanged, upper, Opens mouth, Relaxed after feeding, Rhythmic suck  LATCH Documentation  Latch: Grasps breast, tongue down, lips flanged, rhythmic sucking  Audible Swallowing: Spontaneous and intermittent (24 hours old)  Type of Nipple: Everted (after stimulation)  Comfort (Breast/Nipple): Soft/non-tender  Hold (Positioning): Full assist, teach one side, mother does other, staff holds (Plans for eventual return home discussed)  LATCH Score: 9  Phototherapy Care:  Phototherapy and high bilirubin levels may disrupt breastfeeding if baby is very sleepy,  from mother, feeding cues missed, and potential clinical intervention of supplementation if ordered by physician. Keep baby in the room during phototherapy as able. Phototherapy blanket allows for infant to be held and nursed while still receiving treatment.   Close contact with baby reduces the chance of missing feeding cues. Frequent (every 2 hours) efficient feedings help lower jaundice levels by the passage of bilirubin in baby's stool. Parent(s) will be taught to provide infant with hand expressed colostrum (minimum of 10-20 drops) at any non-feedings and that pumping may be introduced if further intervention is necessary and/or if physician orders supplementation. Engorgement Care Guidelines:  Reviewed how milk is made and normal phases of milk production. Taught care of engorged breasts - frequent breastfeeding encouraged, cool packs and motrin as tolerated. Anticipatory guidance shared.

## 2017-02-25 NOTE — PROGRESS NOTES
Post-Partum Day Number 2 Progress Note    Patient doing well post-partum without significant complaints. Voiding without difficulty, normal lochia. Vitals:  Patient Vitals for the past 24 hrs:   BP Temp Pulse Resp   17 0751 132/78 97.7 °F (36.5 °C) 69 16   17 0133 130/77 98 °F (36.7 °C) 72 -   17 2148 129/75 98 °F (36.7 °C) 83 20   17 1552 133/78 98.2 °F (36.8 °C) 91 18     Temp (24hrs), Av °F (36.7 °C), Min:97.7 °F (36.5 °C), Max:98.2 °F (36.8 °C)      Vital signs stable, afebrile. Exam:     breast    Patient without distress. Abdomen soft, fundus firm, nontender               Lower extremities- nontender without edema; no cords    Labs: No results found for this or any previous visit (from the past 24 hour(s)). Assessment and Plan:  Patient appears to be having uncomplicated post-partum course. Discharge today-instructions reviewed.

## 2017-02-25 NOTE — PROGRESS NOTES
0700: received bedside SBAR report from [address] [place] [address], assumed care of patient at this time with Enrico Graham RN.

## 2017-02-25 NOTE — PROGRESS NOTES
1 Dr Heather Pizarro at bedside to assess pt and discuss plan of care. Pt to be discharged home. 1130 Postpartum discharge instructions discussed with pt. Vss upon discharge. Pt to follow up in six weeks or sooner if needed. Pt verbalized understanding and had no further questions.

## 2017-02-25 NOTE — PROGRESS NOTES
Bedside and Verbal shift change report given to Aisha Hernandes (oncoming nurse) by Gigi Gallegos RNC(offgoing nurse). Report included the following information SBAR, Kardex, MAR and Recent Results.